# Patient Record
Sex: MALE | Race: WHITE
[De-identification: names, ages, dates, MRNs, and addresses within clinical notes are randomized per-mention and may not be internally consistent; named-entity substitution may affect disease eponyms.]

---

## 2018-11-30 ENCOUNTER — HOSPITAL ENCOUNTER (EMERGENCY)
Dept: HOSPITAL 74 - JER | Age: 38
LOS: 1 days | Discharge: HOME | End: 2018-12-01
Payer: COMMERCIAL

## 2018-11-30 VITALS — HEART RATE: 75 BPM | TEMPERATURE: 98.7 F | DIASTOLIC BLOOD PRESSURE: 83 MMHG | SYSTOLIC BLOOD PRESSURE: 135 MMHG

## 2018-11-30 VITALS — BODY MASS INDEX: 26.6 KG/M2

## 2018-11-30 DIAGNOSIS — Z48.01: ICD-10-CM

## 2018-11-30 DIAGNOSIS — Z48.816: Primary | ICD-10-CM

## 2018-11-30 DIAGNOSIS — F95.2: ICD-10-CM

## 2018-11-30 DIAGNOSIS — I10: ICD-10-CM

## 2018-11-30 NOTE — PDOC
History of Present Illness





- General


History Source: Patient


Exam Limitations: No Limitations





- History of Present Illness


Initial Comments: 





12/01/18 01:54


The patient is a 38 year old male with a significant PMH of torrentz and 

hypertension  who presents to the emergency department with penile pain since 

earlier today. The patient reports that he had a circumcision procedure done 

today at about 12 pm. He states that following his procedure he began to 

experience some urinary retention. He state that he contacted his urologist and 

surgeon by which he was told to come to the ED. The patient reports that he was 

concerned that the bandage around penis was too tight. The patient denies any 

other urinary symptoms. He denies and pain. The patient states that he went for 

this procedure secondary to constriction of foreskin during erection and sex. 

The patient denies any other symptoms or complaints. 





Urology: Dr. Martinez








<Cris Scott - Last Filed: 12/01/18 01:54>





<Rosemary Lake - Last Filed: 12/01/18 21:56>





- General


Chief Complaint: Wound


Stated Complaint: EVALUATION


Time Seen by Provider: 11/30/18 23:41





Past History





<Cris Scott - Last Filed: 12/01/18 01:54>





- Past Medical History


COPD: No


Other medical history: tourette's syndrome





- Surgical History


Appendectomy: Yes





- Suicide/Smoking/Psychosocial Hx


Smoking History: Never smoked





<Rosemary Lake - Last Filed: 12/01/18 21:56>





- Past Medical History


Allergies/Adverse Reactions: 


 Allergies











Allergy/AdvReac Type Severity Reaction Status Date / Time


 


No Known Allergies Allergy   Verified 11/30/18 23:05











Home Medications: 


Ambulatory Orders





NK [No Known Home Medication]  12/01/18 











**Review of Systems





- Review of Systems


Able to Perform ROS?: Yes


Comments:: 





12/01/18 01:55


GENERAL/CONSTITUTIONAL: No fever or chills. No weakness.


HEAD, EYES, EARS, NOSE AND THROAT: No change in vision. No ear pain or 

discharge. No sore throat.


CARDIOVASCULAR: No chest pain or shortness of breath.


RESPIRATORY: No cough, wheezing, or hemoptysis.


GASTROINTESTINAL: No nausea, vomiting, diarrhea or constipation.


GENITOURINARY: (+)urinary retention. No dysuria, frequency, or change in 

urination.


MUSCULOSKELETAL: No joint or muscle swelling or pain. No neck or back pain.


SKIN: No rash


NEUROLOGIC: No headache, vertigo, loss of consciousness, or change in strength/

sensation.


ENDOCRINE: No increased thirst. No abnormal weight change.


HEMATOLOGIC/LYMPHATIC: No anemia, easy bleeding, or history of blood clots.


ALLERGIC/IMMUNOLOGIC: No hives or skin allergy.








<Cris Scott - Last Filed: 12/01/18 01:54>





*Physical Exam





- Vital Signs


 Last Vital Signs











Temp Pulse Resp BP Pulse Ox


 


 98.7 F   75   18   135/83   100 


 


 11/30/18 23:06  11/30/18 23:06  11/30/18 23:06  11/30/18 23:06  11/30/18 23:06














- Physical Exam


Comments: 





12/01/18 01:54


GENERAL:Awake, alert, and fully oriented, in no acute distress.Removed dressing 

from site and placed new bandages. Able to pee normally


HEAD: No signs of trauma


EYES: PERRLA, EOMI, sclera anicteric, conjunctiva clear


ENT: Auricles normal inspection, hearing grossly normal, nares patent, 

oropharynx clear without exudates. Moist mucosa


NECK: Normal ROM, supple, no lymphadenopathy, JVD, or masses


LUNGS: Breath sounds equal, clear to auscultation bilaterally.  No wheezes, and 

no crackles


HEART: Regular rate and rhythm, normal S1 and S2, no murmurs, rubs or gallops


ABDOMEN: Soft, nontender, normoactive bowel sounds.  No guarding, no rebound.  

No masses


EXTREMITIES: Normal range of motion, no edema.  No clubbing or cyanosis. No 

cords, erythema, or tenderness


NEUROLOGICAL: Cranial nerves II through XII grossly intact.  Normal speech, 

normal gait


SKIN:(+)8-6 sutures around base of penis with minimal bleeding. Penis head is 

swollen.  Warm, Dry, normal turgor, no rashes or lesions noted.








<Cris Scott - Last Filed: 12/01/18 01:54>





- Vital Signs


 Last Vital Signs











Temp Pulse Resp BP Pulse Ox


 


 98.7 F   75   18   135/83   100 


 


 11/30/18 23:06  11/30/18 23:06  11/30/18 23:06  11/30/18 23:06  11/30/18 23:06














<Rosemary Lake - Last Filed: 12/01/18 21:56>





Moderate Sedation





- Procedure Monitoring


Vital Signs: 


Procedure Monitoring Vital Signs











Temperature  98.7 F   11/30/18 23:06


 


Pulse Rate  75   11/30/18 23:06


 


Respiratory Rate  18   11/30/18 23:06


 


Blood Pressure  135/83   11/30/18 23:06


 


O2 Sat by Pulse Oximetry (%)  100   11/30/18 23:06











<Cris Scott - Last Filed: 12/01/18 01:54>





- Procedure Monitoring


Vital Signs: 


Procedure Monitoring Vital Signs











Temperature  98.7 F   11/30/18 23:06


 


Pulse Rate  75   11/30/18 23:06


 


Respiratory Rate  18   11/30/18 23:06


 


Blood Pressure  135/83   11/30/18 23:06


 


O2 Sat by Pulse Oximetry (%)  100   11/30/18 23:06











<Rosemary Lake - Last Filed: 12/01/18 21:56>





Medical Decision Making





- Medical Decision Making





12/01/18 21:55


Stable for discharge.  Pt appears well.





<Rosemary Lake - Last Filed: 12/01/18 21:56>





*DC/Admit/Observation/Transfer





- Attestations


Scribe Attestion: 





12/01/18 01:54





Documentation prepared by Cris Scott, acting as medical scribe for Rosemary Lake MD.





<Cris Scott - Last Filed: 12/01/18 01:54>





- Discharge Dispostion


Decision to Admit order: No





<Rosemary Lake - Last Filed: 12/01/18 21:56>


Diagnosis at time of Disposition: 


 Aftercare for circumcision








- Discharge Dispostion


Disposition: HOME


Condition at time of disposition: Stable





- Referrals


Referrals: 


Seven Franklin MD [Primary Care Provider] - 





- Patient Instructions


Printed Discharge Instructions:  How to Care for a Surgical Wound





- Post Discharge Activity